# Patient Record
Sex: FEMALE | Race: WHITE | NOT HISPANIC OR LATINO
[De-identification: names, ages, dates, MRNs, and addresses within clinical notes are randomized per-mention and may not be internally consistent; named-entity substitution may affect disease eponyms.]

---

## 2019-01-21 PROBLEM — Z00.00 ENCOUNTER FOR PREVENTIVE HEALTH EXAMINATION: Status: ACTIVE | Noted: 2019-01-21

## 2019-09-23 ENCOUNTER — RESULT REVIEW (OUTPATIENT)
Age: 29
End: 2019-09-23

## 2019-09-23 ENCOUNTER — APPOINTMENT (OUTPATIENT)
Dept: HEMATOLOGY ONCOLOGY | Facility: CLINIC | Age: 29
End: 2019-09-23
Payer: COMMERCIAL

## 2019-09-23 VITALS
SYSTOLIC BLOOD PRESSURE: 137 MMHG | BODY MASS INDEX: 43.4 KG/M2 | WEIGHT: 293 LBS | HEIGHT: 68.9 IN | OXYGEN SATURATION: 99 % | TEMPERATURE: 98.1 F | RESPIRATION RATE: 18 BRPM | DIASTOLIC BLOOD PRESSURE: 84 MMHG | HEART RATE: 110 BPM

## 2019-09-23 DIAGNOSIS — Z86.2 PERSONAL HISTORY OF DISEASES OF THE BLOOD AND BLOOD-FORMING ORGANS AND CERTAIN DISORDERS INVOLVING THE IMMUNE MECHANISM: ICD-10-CM

## 2019-09-23 DIAGNOSIS — D50.9 IRON DEFICIENCY ANEMIA, UNSPECIFIED: ICD-10-CM

## 2019-09-23 DIAGNOSIS — Z87.19 PERSONAL HISTORY OF OTHER DISEASES OF THE DIGESTIVE SYSTEM: ICD-10-CM

## 2019-09-23 DIAGNOSIS — Z80.1 FAMILY HISTORY OF MALIGNANT NEOPLASM OF TRACHEA, BRONCHUS AND LUNG: ICD-10-CM

## 2019-09-23 DIAGNOSIS — Z82.3 FAMILY HISTORY OF STROKE: ICD-10-CM

## 2019-09-23 DIAGNOSIS — E66.9 OBESITY, UNSPECIFIED: ICD-10-CM

## 2019-09-23 DIAGNOSIS — Z78.9 OTHER SPECIFIED HEALTH STATUS: ICD-10-CM

## 2019-09-23 DIAGNOSIS — Z80.3 FAMILY HISTORY OF MALIGNANT NEOPLASM OF BREAST: ICD-10-CM

## 2019-09-23 DIAGNOSIS — Z86.39 PERSONAL HISTORY OF OTHER ENDOCRINE, NUTRITIONAL AND METABOLIC DISEASE: ICD-10-CM

## 2019-09-23 DIAGNOSIS — F41.9 ANXIETY DISORDER, UNSPECIFIED: ICD-10-CM

## 2019-09-23 PROCEDURE — 99205 OFFICE O/P NEW HI 60 MIN: CPT | Mod: 25

## 2019-09-23 NOTE — REVIEW OF SYSTEMS
[Anxiety] : anxiety [Negative] : Allergic/Immunologic [Suicidal] : not suicidal [Depression] : no depression [Insomnia] : no insomnia

## 2019-09-23 NOTE — ASSESSMENT
[FreeTextEntry1] : We have reviewed the diagnosis of iron deficiency anemia and the causes of iron deficiency anemia.\par We have discussed that in this age group it is likely from her menses, although she does not have heavy menses.\par Will check iron panel and ferritin to see if she needs further supplementation. Will also check occult blood. If positive will send for GI evaluation. Will also check B12 and folic acid to make sure that isn't contributing to her anemia. Previous work up including TSH was wnl and hgb electrophoresis showed normal pattern with microcytosis which is seen in iron deficiency. If she is iron deficient, she did not like the way the iron pills made her feel and we will proceed with IV iron in the form of injectafer 750 mg IV x 2 doses 1 week apart. We have reviewed the side effects of injectafer to include but are not limited to infusion reactions, HA, Nausea, hypophosphatemia.\par \par Recommend follow up with PCP regarding anxiety and to work on weightloss.\par RTC in 3 months with repeat labs of cbc with diff, cmp, iron panel and ferritin. If she required IV iron I will see her after her first infusion.\par

## 2019-09-23 NOTE — HISTORY OF PRESENT ILLNESS
[de-identified] : Ms. Zamora is a 30 yo female with a pmhx of morbid obesity who presents in consultation for a new diagnosis of iron deficiency anemia. She was seen in the ED for cholecystitis in January 2019 and was found to be anemic with a hgb of 10.9. Further work up revealed iron of 19, TIBC 425, % sat 4, and ferritin 9.6. She was discharged on Ferrous Sulfate 325 mg PO TID. She did not like taking the pills as it " made her feel funny." She completed the prescription though and stopped iron pills around February. She denies bleeding. Denies epistaxis, gingival bleeding, hematuria, hematochezia, melena, bruising or bleeding. She states that her periods last 2-3 days and she has to change her pads 3 times a day. She denies chest pain, SOB, dizziness, PICA. Denies fevers, chills, nausea, vomiting, diarrhea, constipation, abd pain,  palpitations, cough, dizziness, headaches, vision changes, neuropathy, or bone pain.\par

## 2019-12-16 ENCOUNTER — APPOINTMENT (OUTPATIENT)
Dept: HEMATOLOGY ONCOLOGY | Facility: CLINIC | Age: 29
End: 2019-12-16

## 2020-06-05 ENCOUNTER — APPOINTMENT (OUTPATIENT)
Dept: PODIATRY | Facility: CLINIC | Age: 30
End: 2020-06-05
Payer: COMMERCIAL

## 2020-06-05 VITALS — WEIGHT: 293 LBS | HEIGHT: 68 IN | BODY MASS INDEX: 44.41 KG/M2

## 2020-06-05 DIAGNOSIS — G60.9 HEREDITARY AND IDIOPATHIC NEUROPATHY, UNSPECIFIED: ICD-10-CM

## 2020-06-05 PROCEDURE — 99203 OFFICE O/P NEW LOW 30 MIN: CPT

## 2020-06-05 NOTE — REVIEW OF SYSTEMS
[Leg Claudication] : no intermittent leg claudication [Lower Ext Edema] : lower extremity edema [Shortness Of Breath] : no shortness of breath [Cough] : no cough [Difficulty Walking] : difficulty walking [Limb Weakness] : limb weakness [Negative] : Heme/Lymph

## 2020-06-05 NOTE — PHYSICAL EXAM
[General Appearance - Alert] : alert [General Appearance - In No Acute Distress] : in no acute distress [Full Pulse] : the pedal pulses are present [Edema] : there was no peripheral edema [Veins - Varicosity Changes] : there were no varicosital changes [FreeTextEntry1] : The patient states that for the past 4 days she has been unable to bear weight on her right foot because of the complete numbness. When asked if there is any  of pain to the area she stated that there was none. Patient states there's been no change to the lifestyle or any new instance of prolonged seating, MVA, of compression typr injuries and denies [Oriented To Time, Place, And Person] : oriented to person, place, and time [Impaired Insight] : insight and judgment were intact [Affect] : the affect was normal

## 2020-06-05 NOTE — HISTORY OF PRESENT ILLNESS
[FreeTextEntry1] : Location: right foot\par Duration:5 days\par Acute: yes\par Chronic:\par Past Tx: urgent care and xrays, heating pad\par Exacerbated by: walking and weight bearing\par Patient denies any hx of injury or trauma or prior history\par \par

## 2021-10-31 ENCOUNTER — NON-APPOINTMENT (OUTPATIENT)
Age: 31
End: 2021-10-31

## 2021-11-26 ENCOUNTER — NON-APPOINTMENT (OUTPATIENT)
Age: 31
End: 2021-11-26

## 2021-11-26 ENCOUNTER — APPOINTMENT (OUTPATIENT)
Dept: NEUROLOGY | Facility: CLINIC | Age: 31
End: 2021-11-26
Payer: COMMERCIAL

## 2021-11-26 VITALS
DIASTOLIC BLOOD PRESSURE: 97 MMHG | HEIGHT: 68 IN | TEMPERATURE: 97.6 F | SYSTOLIC BLOOD PRESSURE: 133 MMHG | BODY MASS INDEX: 41.98 KG/M2 | HEART RATE: 125 BPM | WEIGHT: 277 LBS

## 2021-11-26 DIAGNOSIS — M79.89 OTHER SPECIFIED SOFT TISSUE DISORDERS: ICD-10-CM

## 2021-11-26 DIAGNOSIS — M79.672 PAIN IN RIGHT FOOT: ICD-10-CM

## 2021-11-26 DIAGNOSIS — M79.671 PAIN IN RIGHT FOOT: ICD-10-CM

## 2021-11-26 DIAGNOSIS — G89.29 PAIN IN RIGHT FOOT: ICD-10-CM

## 2021-11-26 DIAGNOSIS — E55.9 VITAMIN D DEFICIENCY, UNSPECIFIED: ICD-10-CM

## 2021-11-26 PROCEDURE — 99072 ADDL SUPL MATRL&STAF TM PHE: CPT

## 2021-11-26 PROCEDURE — 99204 OFFICE O/P NEW MOD 45 MIN: CPT

## 2021-11-26 RX ORDER — GABAPENTIN 300 MG/1
300 CAPSULE ORAL
Qty: 90 | Refills: 1 | Status: DISCONTINUED | COMMUNITY
Start: 2020-06-05 | End: 2021-11-26

## 2021-11-26 NOTE — REASON FOR VISIT
[Consultation] : a consultation visit [Family Member] : family member [FreeTextEntry1] : Pt states she has been experiencing pain in both feet.

## 2021-11-26 NOTE — PHYSICAL EXAM
[FreeTextEntry1] : General: toes curled, disfigured, pale and cold to touch , trace dorsalis pedis pulses b/l \par Mental Status: alert and oriented to person, place, time. Knows the president. Able to name objects. Able to spell “world backwards”. Initially stated 7 quarters in $1.50 but the corrected to 6 quarters. \par CN: VFF, blink to confrontation \par III, IV, VI, PERRL, EOMI \par V sensation normal to light touch \par VII normal squint vs resistance, normal smile, face symmetric \par VIII: normal hearing \par IX, X normal gag, symmetric palate, uvula raises midline \par XI normal shrug versus resistance and lateralization of head versus resistance \par XII tongue symmetric, normal strength, no tremor or fasciculation \par Sensory: normal to LT, diminished vibratory sensation in bilateral lower extremities. Sensation to pinprick increases as move up leg more proximally\par Motor: full strength throughout  \par Reflexes \par Brachioradialis, biceps, triceps, patella trace , ankles, unable to elicit \par Plantar flexor response bilaterally \par Coordination: no dysmetria on FNF \par Gait : antalgic, slightly wide-based gait \par

## 2021-11-26 NOTE — DISCUSSION/SUMMARY
[FreeTextEntry1] : Sharri is a pleasant 31-year-old lady with history of morbid obesity, anemia who is presenting with left heel pain with radiation to lateral aspect of foot that comes and goes. Worse when seated. \par Possible tarsal tunnel? although pain is more at the heel than the sole of the  foot and is worse when seated than standing which is typical. No back pain with radiation down the leg. \par \par Feet are ulcerated, disfigured, pale , cold to touch. Will refer to vascular surgeon to make sure circulation is okay in feet, not contributing to presentation. Will do nerve conduction test and neuropathy work-up.

## 2021-11-26 NOTE — ASSESSMENT
[FreeTextEntry1] : EMG/NCS left lower extremity , differential is tarsel tunnel syndrome \par Basic labs \par consider physical therapy \par vascular surgery referral - evaluate circulation in lower extremities \par \par RTC in 3 months

## 2021-11-26 NOTE — REVIEW OF SYSTEMS
[Joint Pain] : joint pain [Limb Pain] : limb pain [Chills] : no chills [Feeling Poorly] : not feeling poorly [Recent Weight Gain (___ Lbs)] : no recent weight gain [Memory Lapses or Loss] : no memory loss [Arm Weakness] : no arm weakness [Tingling] : no tingling [Fainting] : no fainting [Cluster Headache] : no cluster headache [Inability to Walk] : able to walk [Suicidal] : not suicidal [Depression] : no depression [Change In Personality] : no personality change [Eye Pain] : no eye pain [Eyesight Problems] : no eyesight problems [Dry Eyes] : no dryness of the eyes [Earache] : no earache [Nasal Discharge] : no nasal discharge [Hoarseness] : no hoarseness [Heart Rate Is Slow] : the heart rate was not slow [Chest Pain] : no chest pain [Lower Ext Edema] : no lower extremity edema [Wheezing] : no wheezing [Cough] : no cough [PND] : no PND [Vomiting] : no vomiting [Diarrhea] : no diarrhea [Melena] : no melena [Incontinence] : no incontinence [Dysmenorrhea] : no dysmenorrhea [Abn Vaginal Bleeding] : no unexplained vaginal bleeding [Skin Lesions] : no skin lesions [Change In A Mole] : no change in a mole [Breast Lump] : no breast lump [Proptosis] : no proptosis [Muscle Weakness] : no muscle weakness [Feelings Of Weakness] : no feelings of weakness [Easy Bruising] : no tendency for easy bruising [Swollen Glands In The Neck] : no swollen glands in the neck

## 2021-11-26 NOTE — HISTORY OF PRESENT ILLNESS
[FreeTextEntry1] : Sharri Zamora is a 31-year-old right-handed young lady with a history of morbid obesity and anemia who is presenting for follow-up for pain in her left heel which comes and goes. She was sent by her podiatrist, Dr. Mynor Thacker who told her she should see neurologist. \par \par Of note, she had seen Dr. Izquierdo, podiatry, 6/2020 due to numbness in her right foot. She was given a trial of gabapentin and referred to a neurologist. It is not clear if she followed-up with a neurologist. She had an adverse reaction to gabapentin and developed a blistering rash on both her feet. She had difficulty walking and was in a rehab facility from August to October 2020. She ambulates with a rolling walker currently. \par \par She also received iron infusion for anemia with Dr. Nielsen in 2019 but has not followed up since. She thinks she had recent blood work and she is no longer anemic. \par \par She reports the pain comes and goes in the left foot at the heel and sometimes there is radiation to the lateral aspect of her foot. Sometimes the left foot goes numb. There is no radiation from the back. She has no known history of diabetes. The pain is worse when seated for a long time. \par \par Past Medical History:\par Morbid obesity \par Gait instability \par \par Surgeries\par None \par \par Allergies: \par Gabapentin – blisters on feet \par \par Social History: \par Live with mom and step-dad\par Graduated high school\par Not driving\par Ambulates with rolling walker \par Used to work as a , not working now. \par No children\par No toxic habits \par \par Allergies: None \par

## 2021-11-27 ENCOUNTER — LABORATORY RESULT (OUTPATIENT)
Age: 31
End: 2021-11-27

## 2021-11-29 DIAGNOSIS — E55.9 VITAMIN D DEFICIENCY, UNSPECIFIED: ICD-10-CM

## 2021-11-29 LAB
25(OH)D3 SERPL-MCNC: 10.9 NG/ML
ALBUMIN SERPL ELPH-MCNC: 4 G/DL
ALP BLD-CCNC: 110 U/L
ALT SERPL-CCNC: 10 U/L
ANION GAP SERPL CALC-SCNC: 14 MMOL/L
AST SERPL-CCNC: 10 U/L
BASOPHILS # BLD AUTO: 0.09 K/UL
BASOPHILS NFR BLD AUTO: 0.9 %
BILIRUB SERPL-MCNC: 0.3 MG/DL
BUN SERPL-MCNC: 20 MG/DL
CALCIUM SERPL-MCNC: 8.8 MG/DL
CHLORIDE SERPL-SCNC: 106 MMOL/L
CO2 SERPL-SCNC: 20 MMOL/L
CREAT SERPL-MCNC: 1.46 MG/DL
CRP SERPL-MCNC: 36 MG/L
EOSINOPHIL # BLD AUTO: 0.17 K/UL
EOSINOPHIL NFR BLD AUTO: 1.6 %
GLUCOSE SERPL-MCNC: 100 MG/DL
HCT VFR BLD CALC: 38.8 %
HGB BLD-MCNC: 11.7 G/DL
IMM GRANULOCYTES NFR BLD AUTO: 0.3 %
LYMPHOCYTES # BLD AUTO: 2.05 K/UL
LYMPHOCYTES NFR BLD AUTO: 19.4 %
MAN DIFF?: NORMAL
MCHC RBC-ENTMCNC: 25.9 PG
MCHC RBC-ENTMCNC: 30.2 GM/DL
MCV RBC AUTO: 86 FL
MONOCYTES # BLD AUTO: 0.63 K/UL
MONOCYTES NFR BLD AUTO: 6 %
NEUTROPHILS # BLD AUTO: 7.58 K/UL
NEUTROPHILS NFR BLD AUTO: 71.8 %
PLATELET # BLD AUTO: 437 K/UL
POTASSIUM SERPL-SCNC: 3.9 MMOL/L
PROT SERPL-MCNC: 7.1 G/DL
RBC # BLD: 4.51 M/UL
RBC # FLD: 15.8 %
SODIUM SERPL-SCNC: 139 MMOL/L
VIT B12 SERPL-MCNC: 381 PG/ML
WBC # FLD AUTO: 10.55 K/UL

## 2021-12-04 LAB
ALBUMIN MFR SERPL ELPH: 46.4 %
ALBUMIN SERPL-MCNC: 3.3 G/DL
ALBUMIN/GLOB SERPL: 0.9 RATIO
ALPHA1 GLOB MFR SERPL ELPH: 6.1 %
ALPHA1 GLOB SERPL ELPH-MCNC: 0.4 G/DL
ALPHA2 GLOB MFR SERPL ELPH: 14.1 %
ALPHA2 GLOB SERPL ELPH-MCNC: 1 G/DL
ANA SER IF-ACNC: NEGATIVE
B-GLOBULIN MFR SERPL ELPH: 15.6 %
B-GLOBULIN SERPL ELPH-MCNC: 1.1 G/DL
GAMMA GLOB FLD ELPH-MCNC: 1.3 G/DL
GAMMA GLOB MFR SERPL ELPH: 17.8 %
INTERPRETATION SERPL IEP-IMP: NORMAL
PROT SERPL-MCNC: 7.1 G/DL
PROT SERPL-MCNC: 7.1 G/DL
VIT B1 SERPL-MCNC: 164.3 NMOL/L

## 2021-12-13 LAB — VIT B6 SERPL-MCNC: 2.2 UG/L

## 2021-12-19 ENCOUNTER — RX RENEWAL (OUTPATIENT)
Age: 31
End: 2021-12-19

## 2021-12-19 RX ORDER — CHOLECALCIFEROL (VITAMIN D3) 25 MCG
25 MCG TABLET ORAL DAILY
Qty: 30 | Refills: 5 | Status: ACTIVE | COMMUNITY
Start: 2021-12-19 | End: 1900-01-01

## 2021-12-23 ENCOUNTER — RX RENEWAL (OUTPATIENT)
Age: 31
End: 2021-12-23

## 2021-12-23 RX ORDER — OMEGA-3/DHA/EPA/FISH OIL 35-113.5MG
1000 TABLET,CHEWABLE ORAL
Qty: 30 | Refills: 0 | Status: ACTIVE | COMMUNITY
Start: 2021-11-29 | End: 1900-01-01

## 2021-12-26 ENCOUNTER — NON-APPOINTMENT (OUTPATIENT)
Age: 31
End: 2021-12-26

## 2022-01-03 NOTE — PROCEDURE
Internal Medicine Heme/Onc Internal Medicine Internal Medicine Internal Medicine Internal Medicine Internal Medicine Internal Medicine Internal Medicine [FreeTextEntry1] : I mentioned to the patient some possible contributing etiologies such as diabetes, spinal stenosis, lumbar spine problems, excessive alcohol use, and injury or trauma she denies the above contributing factors. I did the patient that I do not feel this is an intrinsic problem with her foot rather higher up with the leg in the back. The remaining intrinsic factor causing numbness in the foot this either compression, a crush injury, neuroma which she states they're no contributing symptoms to either of those etiologies\par Will give trial course of gabapentin and refer to neurology after short term course of gabapentin Internal Medicine Internal Medicine Internal Medicine Internal Medicine Internal Medicine

## 2022-01-25 ENCOUNTER — APPOINTMENT (OUTPATIENT)
Dept: VASCULAR SURGERY | Facility: CLINIC | Age: 32
End: 2022-01-25

## 2022-05-05 ENCOUNTER — APPOINTMENT (OUTPATIENT)
Dept: NEUROLOGY | Facility: CLINIC | Age: 32
End: 2022-05-05

## 2022-05-16 ENCOUNTER — APPOINTMENT (OUTPATIENT)
Dept: NEUROLOGY | Facility: CLINIC | Age: 32
End: 2022-05-16